# Patient Record
Sex: FEMALE | Race: WHITE | Employment: FULL TIME | URBAN - METROPOLITAN AREA
[De-identification: names, ages, dates, MRNs, and addresses within clinical notes are randomized per-mention and may not be internally consistent; named-entity substitution may affect disease eponyms.]

---

## 2019-07-11 ENCOUNTER — APPOINTMENT (OUTPATIENT)
Dept: SLEEP MEDICINE | Facility: MEDICAL CENTER | Age: 74
End: 2019-07-11
Payer: COMMERCIAL

## 2019-08-26 ENCOUNTER — HOSPITAL ENCOUNTER (EMERGENCY)
Facility: MEDICAL CENTER | Age: 74
End: 2019-08-26
Attending: EMERGENCY MEDICINE
Payer: COMMERCIAL

## 2019-08-26 VITALS
BODY MASS INDEX: 32.63 KG/M2 | WEIGHT: 207.89 LBS | DIASTOLIC BLOOD PRESSURE: 64 MMHG | OXYGEN SATURATION: 96 % | SYSTOLIC BLOOD PRESSURE: 125 MMHG | TEMPERATURE: 97.2 F | HEART RATE: 66 BPM | RESPIRATION RATE: 18 BRPM | HEIGHT: 67 IN

## 2019-08-26 DIAGNOSIS — L03.116 CELLULITIS OF LEFT LOWER EXTREMITY: ICD-10-CM

## 2019-08-26 PROCEDURE — 99283 EMERGENCY DEPT VISIT LOW MDM: CPT

## 2019-08-26 RX ORDER — DOXYCYCLINE 100 MG/1
100 CAPSULE ORAL 2 TIMES DAILY
Qty: 20 CAP | Refills: 0 | Status: SHIPPED | OUTPATIENT
Start: 2019-08-26 | End: 2019-09-05

## 2019-08-26 RX ORDER — AMOXICILLIN 500 MG/1
500 CAPSULE ORAL 3 TIMES DAILY
Qty: 30 CAP | Refills: 0 | Status: SHIPPED | OUTPATIENT
Start: 2019-08-26 | End: 2019-09-05

## 2019-08-26 RX ORDER — SULFAMETHOXAZOLE AND TRIMETHOPRIM 800; 160 MG/1; MG/1
1 TABLET ORAL 2 TIMES DAILY
Status: SHIPPED | COMMUNITY
End: 2023-03-27

## 2019-08-26 NOTE — ED TRIAGE NOTES
"Chief Complaint   Patient presents with   • Rash     Pt had to bug bites on L lower leg that was treated with abx. Today presents with changes to rash and itching.      /67   Pulse 71   Temp 36.2 °C (97.2 °F) (Temporal)   Resp 16   Ht 1.702 m (5' 7\")   Wt 94.3 kg (207 lb 14.3 oz)   SpO2 96%   Breastfeeding? No   BMI 32.56 kg/m²     Pt ambulated into triage, VS as above, NAD, encouraged to return to the triage nurse or tech with any new complaints or symptoms.  "

## 2019-08-26 NOTE — ED NOTES
Discharge instructions given, pt verbalized understanding.  Prescription instructions given, pt verbalized understanding.  A&ox4.  VSS.  Ambulates out of ER.  Encouraged to follow up with PCP.

## 2019-08-26 NOTE — DISCHARGE INSTRUCTIONS
Take antibiotics as prescribed.  Return for pain, swelling, redness, or fever.  Follow-up with your doctor in 2 days or return to emergency department if you are not improving.

## 2019-08-26 NOTE — ED PROVIDER NOTES
ED Provider Note    CHIEF COMPLAINT  Chief Complaint   Patient presents with   • Rash     Pt had to bug bites on L lower leg that was treated with abx. Today presents with changes to rash and itching.        HPI  Salima Arroyo is a 73 y.o. female who presents to the emergency room including of a rash on her left leg.  Patient woke up in the morning the 15th and had 2 bites on her leg.  She did not feel it better and she is woke up with it.  These were 2 discrete areas of erythema.  Both on her left leg.  They seem to worsen over the course of several days on 17th she was seen at urgent care and started on Septra.  She states that about it seemed to improve and today she woke up with a change in characteristic to the rash.  It is now has a large area of central clearing with a rim of erythema.  The initial bites in the center are still visible but much less red.  She denies any systemic complaints.  No fevers no chills no nausea no vomiting.  No other acute concerns or complaints.  No other bleeding or bruising.    REVIEW OF SYSTEMS  See HPI for further details. All other systems are negative.    PAST MEDICAL HISTORY  Past Medical History:   Diagnosis Date   • Arthritis    • Breath shortness     2l/nc [facility]   • Cataract    • Diabetes (HCC)     not on meds, had gastric bypass 5 yrs ago   • Heart burn    • Hyperlipidemia    • Hypertension    • Pain     knees   • Psychiatric problem     depression   • Sleep apnea     had apnea before weight loss with gastric bypass   • Stroke (HCC) 4-2011    loss of peripheral vision       FAMILY HISTORY  History reviewed. No pertinent family history.    SOCIAL HISTORY  Social History     Socioeconomic History   • Marital status:      Spouse name: Not on file   • Number of children: Not on file   • Years of education: Not on file   • Highest education level: Not on file   Occupational History   • Not on file   Social Needs   • Financial resource strain: Not on  file   • Food insecurity:     Worry: Not on file     Inability: Not on file   • Transportation needs:     Medical: Not on file     Non-medical: Not on file   Tobacco Use   • Smoking status: Former Smoker     Packs/day: 2.00     Years: 30.00     Pack years: 60.00     Types: Cigarettes     Last attempt to quit: 1995     Years since quittin.0   • Smokeless tobacco: Never Used   Substance and Sexual Activity   • Alcohol use: Yes     Comment: 2 per day   • Drug use: Not on file   • Sexual activity: Not on file   Lifestyle   • Physical activity:     Days per week: Not on file     Minutes per session: Not on file   • Stress: Not on file   Relationships   • Social connections:     Talks on phone: Not on file     Gets together: Not on file     Attends Jewish service: Not on file     Active member of club or organization: Not on file     Attends meetings of clubs or organizations: Not on file     Relationship status: Not on file   • Intimate partner violence:     Fear of current or ex partner: Not on file     Emotionally abused: Not on file     Physically abused: Not on file     Forced sexual activity: Not on file   Other Topics Concern   • Not on file   Social History Narrative   • Not on file       SURGICAL HISTORY  Past Surgical History:   Procedure Laterality Date   • KNEE ARTHROPLASTY TOTAL Right 11/3/2016    Procedure: KNEE ARTHROPLASTY TOTAL;  Surgeon: Theo Cochran M.D.;  Location: SURGERY [facility];  Service:    • CATARACT EXTRACTION WITH IOL     • GASTRIC BYPASS LAPAROSCOPIC     • GYN SURGERY      tubal lig   • KNEE ARTHROSCOPY     • OTHER      T&A   • OTHER ABDOMINAL SURGERY      gastric bypass   • TUBAL LIGATION         CURRENT MEDICATIONS  Home Medications     Reviewed by Melina Torrez R.N. (Registered Nurse) on 19 at 1428  Med List Status: Partial   Medication Last Dose Status   acetaminophen (TYLENOL) 325 MG Tab  Active   amlodipine (NORVASC) 5 MG Tab  Active   atorvastatin  "(LIPITOR) 10 MG TABS  Active   Cholecalciferol (VITAMIN D) 2000 UNITS Cap  Active   Diphenhydramine-APAP, sleep, (TYLENOL PM EXTRA STRENGTH)  MG Tab  Active   docusate sodium 100 MG Cap  Active   duloxetine (CYMBALTA) 30 MG Cap DR Particles  Active   Esomeprazole Magnesium (NEXIUM) 40 MG PACK  Active   fluticasone (FLONASE) 50 MCG/ACT nasal spray  Active   metoprolol SR (TOPROL XL) 25 MG TB24  Active   Multiple Vitamins-Minerals (PRESERVISION AREDS) Cap  Active   oxycodone immediate-release (ROXICODONE) 5 MG Tab  Active   rivaroxaban (XARELTO) 20 MG Tab tablet  Active   sulfamethoxazole-trimethoprim (BACTRIM DS) 800-160 MG tablet 8/26/2019 Active                ALLERGIES  Allergies   Allergen Reactions   • Nkda [No Known Drug Allergy]        PHYSICAL EXAM  VITAL SIGNS: /67   Pulse 71   Temp 36.2 °C (97.2 °F) (Temporal)   Resp 16   Ht 1.702 m (5' 7\")   Wt 94.3 kg (207 lb 14.3 oz)   SpO2 96%   Breastfeeding? No   BMI 32.56 kg/m²    Constitutional: Well developed, Well nourished, No acute distress, Non-toxic appearance.   HENT: Normocephalic, Atraumatic,   Cardiovascular: Normal heart rate, Normal rhythm, No murmurs, No rubs, No gallops.   Thorax & Lungs: Normal breath sounds, No respiratory distress, No wheezing,  Abdomen: Bowel sounds normal, Soft, No tenderness,    Musculoskeletal: Good range of motion in all major joints.  Left lower external he has an area that looks like cellulitis.  She has 2 bites that are almost resolved but around that there is a central area of clearing with a surrounding rim of erythema.  This is been outlined with the patient.  Is hot to the touch is not swollen.  Normal neurovascular examination per  Neurologic: Alert No focal deficits noted.   Psychiatric: Affect normal,           COURSE & MEDICAL DECISION MAKING  Pertinent Labs & Imaging studies reviewed. (See chart for details)  Patient was on Septra from the urgent care and she improved with this did not " resolve.    Additional diagnosis still cellulitis versus possible Lyme disease.  The patient has a targetoid type rash with a central area of clearing.  The central bite is still noticeable but not as red.    Rim is red hot.  This looks like cellulitis.  The central clearing makes me concern for Lyme disease.  This discussed with ID pharmacist team who came looked at it.  They recommend treatment amoxicillin to doxycycline.  I think this covers both staph, strep, and Lyme disease.  The patient is comfortable with the plan.      She was on Septra but she did not completely resolve therefore switch her antibiotics.  The patient was discharged home with close return precautions.    The plan is to return for pain, swelling, redness fever or if not improving in a couple of days.  Questions were answered, she is agreeable to plan she is discharged in good condition.      The patient was noted to have elevated blood pressure while in the ER and was counseled to see their doctor within one wee to have this rechecked.       Corina Allen M.D.  [address] [place] [id number]  [phone]            .  Discharge prescriptions are amoxicillin and doxycycline        FINAL IMPRESSION  1. Cellulitis of left lower extremity         2.   3.         Electronically signed by: Hakeem Castle, 8/26/2019 3:00 PM

## 2021-01-15 DIAGNOSIS — Z23 NEED FOR VACCINATION: ICD-10-CM

## 2023-03-24 PROBLEM — M75.41 IMPINGEMENT SYNDROME OF RIGHT SHOULDER: Status: ACTIVE | Noted: 2023-03-24

## 2023-03-24 PROBLEM — S46.011A STRAIN OF TENDON OF RIGHT ROTATOR CUFF: Status: ACTIVE | Noted: 2023-03-24

## 2023-03-24 PROBLEM — M75.21 BICIPITAL TENDINITIS OF RIGHT SHOULDER: Status: ACTIVE | Noted: 2023-03-24

## 2023-04-22 ENCOUNTER — HOSPITAL ENCOUNTER (OUTPATIENT)
Dept: LAB | Facility: MEDICAL CENTER | Age: 78
End: 2023-04-22
Attending: INTERNAL MEDICINE

## 2023-04-22 LAB
ALBUMIN SERPL BCP-MCNC: 3.8 G/DL (ref 3.2–4.9)
ALBUMIN/GLOB SERPL: 1.3 G/DL
ALP SERPL-CCNC: 70 U/L (ref 30–99)
ALT SERPL-CCNC: 21 U/L (ref 2–50)
ANION GAP SERPL CALC-SCNC: 9 MMOL/L (ref 7–16)
AST SERPL-CCNC: 24 U/L (ref 12–45)
BILIRUB SERPL-MCNC: 0.3 MG/DL (ref 0.1–1.5)
BUN SERPL-MCNC: 12 MG/DL (ref 8–22)
CALCIUM ALBUM COR SERPL-MCNC: 9.3 MG/DL (ref 8.5–10.5)
CALCIUM SERPL-MCNC: 9.1 MG/DL (ref 8.5–10.5)
CHLORIDE SERPL-SCNC: 108 MMOL/L (ref 96–112)
CHOLEST SERPL-MCNC: 154 MG/DL (ref 100–199)
CO2 SERPL-SCNC: 27 MMOL/L (ref 20–33)
CREAT SERPL-MCNC: 0.57 MG/DL (ref 0.5–1.4)
FASTING STATUS PATIENT QL REPORTED: NORMAL
GFR SERPLBLD CREATININE-BSD FMLA CKD-EPI: 95 ML/MIN/1.73 M 2
GLOBULIN SER CALC-MCNC: 3 G/DL (ref 1.9–3.5)
GLUCOSE SERPL-MCNC: 81 MG/DL (ref 65–99)
HDLC SERPL-MCNC: 64 MG/DL
LDLC SERPL CALC-MCNC: 64 MG/DL
POTASSIUM SERPL-SCNC: 4.9 MMOL/L (ref 3.6–5.5)
PROT SERPL-MCNC: 6.8 G/DL (ref 6–8.2)
SODIUM SERPL-SCNC: 144 MMOL/L (ref 135–145)
TRIGL SERPL-MCNC: 129 MG/DL (ref 0–149)

## 2023-04-22 PROCEDURE — 80061 LIPID PANEL: CPT

## 2023-04-22 PROCEDURE — 80053 COMPREHEN METABOLIC PANEL: CPT

## 2023-04-22 PROCEDURE — 36415 COLL VENOUS BLD VENIPUNCTURE: CPT

## 2023-11-29 ENCOUNTER — PATIENT MESSAGE (OUTPATIENT)
Dept: HEALTH INFORMATION MANAGEMENT | Facility: OTHER | Age: 78
End: 2023-11-29